# Patient Record
(demographics unavailable — no encounter records)

---

## 2025-02-04 NOTE — REASON FOR VISIT
[TextEntry] : Reason for visit: New patient Voids per day:  3-4  Voids per night:  0-1  Urge incontinence: No Stress incontinence: No Constipation: No   Fecal incontinence: No Vaginal bulge: No

## 2025-03-05 NOTE — HISTORY OF PRESENT ILLNESS
[FreeTextEntry1] : Patient in office for consultation of prolonged heavy menses. Pt states menses lasting 10-11 days with heavy flow. patient last year needed a blood transfusion at [facility]. Patient currently does not want an exam and would like to be referred to a female provider. she would like medication to stop her period while she is fasting for her [employer] obligation
[Negative] : Skin

## 2025-03-05 NOTE — PLAN
[FreeTextEntry1] : will order cbc,cmp, pelvic sonogram and refer to female provider and oral iron and provera 10 mg po qd to induce amenorrhea for one cycle